# Patient Record
Sex: FEMALE | Race: WHITE | Employment: FULL TIME | URBAN - METROPOLITAN AREA
[De-identification: names, ages, dates, MRNs, and addresses within clinical notes are randomized per-mention and may not be internally consistent; named-entity substitution may affect disease eponyms.]

---

## 2017-12-22 ENCOUNTER — HOSPITAL ENCOUNTER (OUTPATIENT)
Age: 52
Discharge: HOME | End: 2017-12-22
Attending: FAMILY MEDICINE
Payer: COMMERCIAL

## 2017-12-22 VITALS
HEART RATE: 79 BPM | DIASTOLIC BLOOD PRESSURE: 97 MMHG | SYSTOLIC BLOOD PRESSURE: 146 MMHG | OXYGEN SATURATION: 100 % | BODY MASS INDEX: 20 KG/M2 | RESPIRATION RATE: 20 BRPM | WEIGHT: 130.06 LBS | TEMPERATURE: 100 F

## 2017-12-22 DIAGNOSIS — J02.9 ACUTE VIRAL PHARYNGITIS: ICD-10-CM

## 2017-12-22 DIAGNOSIS — J11.1 INFLUENZA-LIKE ILLNESS: Primary | ICD-10-CM

## 2017-12-22 PROCEDURE — 99214 OFFICE O/P EST MOD 30 MIN: CPT

## 2017-12-22 PROCEDURE — 99204 OFFICE O/P NEW MOD 45 MIN: CPT

## 2017-12-22 PROCEDURE — 87430 STREP A AG IA: CPT | Performed by: FAMILY MEDICINE

## 2017-12-22 PROCEDURE — 87081 CULTURE SCREEN ONLY: CPT | Performed by: FAMILY MEDICINE

## 2017-12-22 RX ORDER — OSELTAMIVIR PHOSPHATE 75 MG/1
75 CAPSULE ORAL 2 TIMES DAILY
Qty: 10 CAPSULE | Refills: 0 | Status: SHIPPED | OUTPATIENT
Start: 2017-12-22 | End: 2018-03-30

## 2017-12-22 RX ORDER — IBUPROFEN 600 MG/1
600 TABLET ORAL ONCE
Status: COMPLETED | OUTPATIENT
Start: 2017-12-22 | End: 2017-12-22

## 2017-12-22 NOTE — ED NOTES
Throat culture obtained, ice chips provided to soothe throat, updated on plan of care & wait time for result.

## 2017-12-22 NOTE — ED PROVIDER NOTES
Patient Seen in: [address] [facility]    History   Patient presents with:  Sore Throat    Stated Complaint: flu like symptoms x2 days    HPI    This 73-year-old female presents to the office with complaint of sore throat, fever, chil Turbinates congested, no bleeding noted. PHARYNX: Erythema is noted, no exudates seen,airway patent, uvula midline  NECK:  Shotty anterior cervical lymphadenopathy. No thyromegaly,  HEART: Regular rate and rhythm, no S3, S4 or murmur noted.   LUNGS: Clear next 5 days. Gargle with salt water or mouth wash. Chloraseptic spray or lozenges for throat discomfort. Push fluids. Humidified Air. Saline nasal spray. You may use a Neti pot for sinus rinses with saline.   You may alternate Tylenol with Ibuprofen every

## 2017-12-22 NOTE — ED INITIAL ASSESSMENT (HPI)
Sore throat, chills, left ear pain & congestion. Progressively worse x 72 hours, nio relief w corticidian PTA.

## 2018-07-18 PROCEDURE — 87088 URINE BACTERIA CULTURE: CPT | Performed by: FAMILY MEDICINE

## 2018-07-18 PROCEDURE — 87086 URINE CULTURE/COLONY COUNT: CPT | Performed by: FAMILY MEDICINE

## 2018-07-18 PROCEDURE — 87186 SC STD MICRODIL/AGAR DIL: CPT | Performed by: FAMILY MEDICINE

## 2018-07-25 ENCOUNTER — LAB ENCOUNTER (OUTPATIENT)
Dept: LAB | Age: 53
End: 2018-07-25
Attending: OBSTETRICS & GYNECOLOGY
Payer: COMMERCIAL

## 2018-07-25 DIAGNOSIS — Z01.419 CERVICAL SMEAR, AS PART OF ROUTINE GYNECOLOGICAL EXAMINATION: ICD-10-CM

## 2018-07-25 PROCEDURE — 87624 HPV HI-RISK TYP POOLED RSLT: CPT

## 2018-07-25 PROCEDURE — 88175 CYTOPATH C/V AUTO FLUID REDO: CPT

## 2018-07-26 LAB — HPV I/H RISK 1 DNA SPEC QL NAA+PROBE: NEGATIVE

## 2018-07-30 LAB — LAST PAP RESULT: NORMAL

## 2018-07-31 NOTE — PROGRESS NOTES
The tests show a negative HPV and normal pap. Recommendation is to repeat pap in 3 years. However follow up for yearly gyne exams are still needed.   Dr Carlyle Marti

## 2020-11-12 PROBLEM — D25.1 INTRAMURAL LEIOMYOMA OF UTERUS: Status: ACTIVE | Noted: 2020-11-12
